# Patient Record
Sex: MALE | Race: WHITE | URBAN - METROPOLITAN AREA
[De-identification: names, ages, dates, MRNs, and addresses within clinical notes are randomized per-mention and may not be internally consistent; named-entity substitution may affect disease eponyms.]

---

## 2020-07-21 ENCOUNTER — TELEPHONE (OUTPATIENT)
Dept: ORTHOPEDIC SURGERY | Age: 30
End: 2020-07-21

## 2020-07-21 ENCOUNTER — OFFICE VISIT (OUTPATIENT)
Dept: ORTHOPEDIC SURGERY | Age: 30
End: 2020-07-21
Payer: COMMERCIAL

## 2020-07-21 VITALS — RESPIRATION RATE: 16 BRPM | WEIGHT: 165 LBS | HEIGHT: 68 IN | BODY MASS INDEX: 25.01 KG/M2

## 2020-07-21 PROBLEM — R22.31 ELBOW MASS, RIGHT: Status: ACTIVE | Noted: 2020-07-21

## 2020-07-21 PROBLEM — G56.21 ULNAR NEUROPATHY AT ELBOW OF RIGHT UPPER EXTREMITY: Status: ACTIVE | Noted: 2020-07-21

## 2020-07-21 PROCEDURE — 99203 OFFICE O/P NEW LOW 30 MIN: CPT | Performed by: ORTHOPAEDIC SURGERY

## 2020-07-21 PROCEDURE — E0191 PROTECTOR HEEL OR ELBOW: HCPCS | Performed by: ORTHOPAEDIC SURGERY

## 2020-07-21 RX ORDER — SILDENAFIL CITRATE 20 MG/1
TABLET ORAL
COMMUNITY

## 2020-07-21 RX ORDER — ACYCLOVIR 800 MG/1
TABLET ORAL
COMMUNITY

## 2020-07-21 NOTE — PROGRESS NOTES
Chief Complaint  Elbow Pain (NP RT ELBOW)      History of Present Illness:  Lauren Woodard is a 31 y.o. male. He presents today for a new hand surgery and elbow specialty consultation at the request of Dr. Mendoza Lovell regarding his right elbow. He works as a banker with [employer] and over the last couple of years he has had a prominence over the posterior medial elbow which has been tender with firm pressure. Over time he feels that both the size of the mass and the pain has increased. Quite frequently now he will get numbness and tingling down the medial forearm into the entire hand. He has not felt any loss of dexterity or overall strength. He does not remember any particular trauma or laceration over the elbow. Medical History  Patient's medications, allergies, past medical, surgical, social and family histories were reviewed and updated as appropriate. Review of Systems  Pertinent items are noted in HPI  Denies fever, chills, confusion, bowel/bladder active change. Review of systems reviewed from Patient History Form dated on 7/21/20 and available in the patient's chart under the Media tab. Vital Signs  Vitals:    07/21/20 0919   Resp: 16       General Exam:   Constitutional: Patient is adequately groomed with no evidence of malnutrition  Mental Status: The patient is oriented to time, place and person. The patient's mood and affect are appropriate. Lymphatic: The lymphatic examination bilaterally reveals all areas to be without enlargement or induration. Neurological: The patient has good coordination. There is no weakness or sensory deficit. Elbow Examination  Inspection: There is a visible prominence with some very slight discoloration over the posterior medial right elbow in line with the cubital tunnel and the course of the ulnar nerve.     Palpation: There is quite a bit of tenderness if I palpate firmly over the prominence which is slightly mobile and directly in line with the cubital tunnel over the posterior medial right elbow. This measures at least 11 mm in maximum diameter. Range of Motion: Full range of motion    Strength: Normal strength including intrinsics distally    Special Tests: Provocative testing for cubital tunnel syndrome is positive especially if I palpate just adjacent to or on the mass. Skin: There are no additional worrisome rashes, ulcerations or lesions. Gait: normal    Circulation normal    Additional Comments:     Additional Examinations:  Left Upper Extremity: Examination of the left upper extremity does not show any tenderness, deformity or injury. Range of motion is unremarkable. There is no gross instability. There are no rashes, ulcerations or lesions. Strength and tone are normal.      Radiology:     X-rays obtained and reviewed in office:  Views 3  Location right elbow  Impression Three views of the right elbow reveal no sign of fracture, no loose body, and satisfactory articular congruity. Assessment: Right elbow pain with posterior medial mass and ulnar neuropathy at the elbow    Impression:  Encounter Diagnoses   Name Primary?  Right elbow pain Yes    Elbow mass, right     Ulnar neuropathy at elbow of right upper extremity        Office Procedures:  Orders Placed This Encounter   Procedures    XR ELBOW RIGHT (MIN 3 VIEWS)     Standing Status:   Future     Number of Occurrences:   1     Standing Expiration Date:   7/21/2021    MRI ELBOW RIGHT W WO CONTRAST     Scheduling Instructions:      [facility] [address]      [phone]            AUTH #:      TIME AND DATE TBD      PLEASE CALL PATIENT ONCE APPROVED TO SCHEDULE       PUSH TO [facility] PACS SYSTEM            Remember that it may take several business days to pre-cert your MRI through your insurance. Our office will contact you as soon as we have the approval. We will not give any test results over the phone.  Please